# Patient Record
Sex: MALE
[De-identification: names, ages, dates, MRNs, and addresses within clinical notes are randomized per-mention and may not be internally consistent; named-entity substitution may affect disease eponyms.]

---

## 2023-01-24 PROBLEM — Z00.00 ENCOUNTER FOR PREVENTIVE HEALTH EXAMINATION: Status: ACTIVE | Noted: 2023-01-24

## 2023-01-31 ENCOUNTER — APPOINTMENT (OUTPATIENT)
Dept: SURGERY | Facility: CLINIC | Age: 73
End: 2023-01-31
Payer: MEDICARE

## 2023-01-31 VITALS
HEIGHT: 64 IN | HEART RATE: 112 BPM | SYSTOLIC BLOOD PRESSURE: 145 MMHG | WEIGHT: 140 LBS | DIASTOLIC BLOOD PRESSURE: 95 MMHG | BODY MASS INDEX: 23.9 KG/M2

## 2023-01-31 DIAGNOSIS — Z80.1 FAMILY HISTORY OF MALIGNANT NEOPLASM OF TRACHEA, BRONCHUS AND LUNG: ICD-10-CM

## 2023-01-31 DIAGNOSIS — Z86.718 PERSONAL HISTORY OF OTHER VENOUS THROMBOSIS AND EMBOLISM: ICD-10-CM

## 2023-01-31 DIAGNOSIS — Z87.898 PERSONAL HISTORY OF OTHER SPECIFIED CONDITIONS: ICD-10-CM

## 2023-01-31 DIAGNOSIS — Z86.79 PERSONAL HISTORY OF OTHER DISEASES OF THE CIRCULATORY SYSTEM: ICD-10-CM

## 2023-01-31 DIAGNOSIS — Z78.9 OTHER SPECIFIED HEALTH STATUS: ICD-10-CM

## 2023-01-31 DIAGNOSIS — F17.210 NICOTINE DEPENDENCE, CIGARETTES, UNCOMPLICATED: ICD-10-CM

## 2023-01-31 PROCEDURE — 99204 OFFICE O/P NEW MOD 45 MIN: CPT

## 2023-01-31 RX ORDER — CETIRIZINE HCL 10 MG
10 TABLET ORAL
Refills: 0 | Status: ACTIVE | COMMUNITY

## 2023-01-31 NOTE — CONSULT LETTER
[Dear  ___] : Dear  [unfilled], [Consult Letter:] : I had the pleasure of evaluating your patient, [unfilled]. [Please see my note below.] : Please see my note below. [Consult Closing:] : Thank you very much for allowing me to participate in the care of this patient.  If you have any questions, please do not hesitate to contact me. [Sincerely,] : Sincerely, [FreeTextEntry2] : Dr. Endy Cleaning, Dr. Jp Dao [FreeTextEntry3] : Candido Culp MD, FACS\par System Director, Endocrine Surgery\par [facility]\par Associate  Professor of Surgery\par [employer] [facility]  [DrRiley  ___] : Dr. PITTMAN

## 2023-01-31 NOTE — REASON FOR VISIT
[Initial Consultation] : an initial consultation for [Family Member] : family member [FreeTextEntry2] : Neck mass

## 2023-01-31 NOTE — PHYSICAL EXAM
[de-identified] : no palpable masses [de-identified] : no palpable thyroid nodules - thyroid below sternal notch [Laryngoscopy Performed] : laryngoscopy was performed, see procedure section for findings [Midline] : located in midline position [Normal] : orientation to person, place, and time: normal [de-identified] : indirect  laryngoscopy shows normal vocal cord mobility bilaterally with no lesions noted

## 2023-01-31 NOTE — ASSESSMENT
[FreeTextEntry1] : requested sonogram guided fine needle aspiration cytology of supraclavicular mass and thyroid nodule with cyto tech present to confirm adequacy of specimens. to call next week for results patient has been given the opportunity to ask questions, and all of the patient's questions have been answered to their satisfaction

## 2023-01-31 NOTE — HISTORY OF PRESENT ILLNESS
[de-identified] : Pt c/o neck mass and thyroid nodule found on CT scan after fall.  denies pain, infection or growth, fever, night sweats, skin cancer excision, dysphagia, hoarseness or skin cancer excision. \par CT scan:  3 cm Right supraclavicular mass, 2.1 cm  cystic nodule isthmus\par I have reviewed all old and new data and available images. Additional information was obtained from others present at the time of visit to ensure the completeness of the history\par

## 2023-02-15 ENCOUNTER — APPOINTMENT (OUTPATIENT)
Dept: ULTRASOUND IMAGING | Facility: IMAGING CENTER | Age: 73
End: 2023-02-15
Payer: MEDICARE

## 2023-02-15 ENCOUNTER — OUTPATIENT (OUTPATIENT)
Dept: OUTPATIENT SERVICES | Facility: HOSPITAL | Age: 73
LOS: 1 days | End: 2023-02-15
Payer: MEDICARE

## 2023-02-15 ENCOUNTER — RESULT REVIEW (OUTPATIENT)
Age: 73
End: 2023-02-15

## 2023-02-15 DIAGNOSIS — E04.1 NONTOXIC SINGLE THYROID NODULE: ICD-10-CM

## 2023-02-15 DIAGNOSIS — R22.1 LOCALIZED SWELLING, MASS AND LUMP, NECK: ICD-10-CM

## 2023-02-15 PROCEDURE — 20206 BIOPSY MUSCLE PERQ NEEDLE: CPT

## 2023-02-15 PROCEDURE — 76942 ECHO GUIDE FOR BIOPSY: CPT

## 2023-02-15 PROCEDURE — 88341 IMHCHEM/IMCYTCHM EA ADD ANTB: CPT

## 2023-02-15 PROCEDURE — 88305 TISSUE EXAM BY PATHOLOGIST: CPT

## 2023-02-15 PROCEDURE — 88172 CYTP DX EVAL FNA 1ST EA SITE: CPT

## 2023-02-15 PROCEDURE — 88173 CYTOPATH EVAL FNA REPORT: CPT | Mod: 26

## 2023-02-15 PROCEDURE — 88342 IMHCHEM/IMCYTCHM 1ST ANTB: CPT | Mod: 26

## 2023-02-15 PROCEDURE — 88342 IMHCHEM/IMCYTCHM 1ST ANTB: CPT

## 2023-02-15 PROCEDURE — 88305 TISSUE EXAM BY PATHOLOGIST: CPT | Mod: 26

## 2023-02-15 PROCEDURE — 88173 CYTOPATH EVAL FNA REPORT: CPT

## 2023-02-15 PROCEDURE — 76942 ECHO GUIDE FOR BIOPSY: CPT | Mod: 26

## 2023-02-15 PROCEDURE — 88341 IMHCHEM/IMCYTCHM EA ADD ANTB: CPT | Mod: 26

## 2023-02-17 LAB — NON-GYNECOLOGICAL CYTOLOGY STUDY: SIGNIFICANT CHANGE UP

## 2023-02-20 ENCOUNTER — TRANSCRIPTION ENCOUNTER (OUTPATIENT)
Age: 73
End: 2023-02-20

## 2023-02-27 ENCOUNTER — NON-APPOINTMENT (OUTPATIENT)
Age: 73
End: 2023-02-27

## 2023-03-21 ENCOUNTER — APPOINTMENT (OUTPATIENT)
Dept: OTOLARYNGOLOGY | Facility: CLINIC | Age: 73
End: 2023-03-21
Payer: MEDICARE

## 2023-03-21 VITALS — SYSTOLIC BLOOD PRESSURE: 138 MMHG | DIASTOLIC BLOOD PRESSURE: 89 MMHG | HEART RATE: 90 BPM

## 2023-03-21 PROCEDURE — 31575 DIAGNOSTIC LARYNGOSCOPY: CPT

## 2023-03-21 PROCEDURE — 99204 OFFICE O/P NEW MOD 45 MIN: CPT | Mod: 25

## 2023-03-21 NOTE — HISTORY OF PRESENT ILLNESS
[de-identified] : pt is referred by Dr. Culp for clavicle mass and FNA on  2/15/2023 Myxoid lesion with chronic inflammation, hemosiderin deposition and scattered spindle cells. Ct of neck 1/2023 3 cm right supraclavicular mass.  \par Fell in Jan 2023 and went to ER - they did a CT scan and found mass\par Denies any pain to area, not bothersome at all\par No recent fevers or infections \par Denies dysphagia, odynophagia, aspirations, dyspnea, dysphonia

## 2023-03-21 NOTE — PROCEDURE
[Gag Reflex] : gag reflex preventing mirror examination [None] : none [Flexible Endoscope] : examined with the flexible endoscope [Serial Number: ___] : Serial Number: [unfilled] [de-identified] : No lesions in the NPx, OPx, HPx or larynx.  VC are mobile, airway patent.\par

## 2023-03-25 ENCOUNTER — OUTPATIENT (OUTPATIENT)
Dept: OUTPATIENT SERVICES | Facility: HOSPITAL | Age: 73
LOS: 1 days | End: 2023-03-25
Payer: MEDICARE

## 2023-03-25 ENCOUNTER — APPOINTMENT (OUTPATIENT)
Dept: CT IMAGING | Facility: CLINIC | Age: 73
End: 2023-03-25
Payer: MEDICARE

## 2023-03-25 DIAGNOSIS — R22.1 LOCALIZED SWELLING, MASS AND LUMP, NECK: ICD-10-CM

## 2023-03-25 PROCEDURE — 70491 CT SOFT TISSUE NECK W/DYE: CPT | Mod: 26,MH

## 2023-03-25 PROCEDURE — 70491 CT SOFT TISSUE NECK W/DYE: CPT

## 2023-04-10 ENCOUNTER — APPOINTMENT (OUTPATIENT)
Dept: CARDIOLOGY | Facility: CLINIC | Age: 73
End: 2023-04-10
Payer: MEDICARE

## 2023-04-10 ENCOUNTER — NON-APPOINTMENT (OUTPATIENT)
Age: 73
End: 2023-04-10

## 2023-04-10 VITALS
BODY MASS INDEX: 24.24 KG/M2 | DIASTOLIC BLOOD PRESSURE: 108 MMHG | HEIGHT: 64 IN | WEIGHT: 142 LBS | SYSTOLIC BLOOD PRESSURE: 164 MMHG | OXYGEN SATURATION: 97 % | HEART RATE: 113 BPM

## 2023-04-10 DIAGNOSIS — M79.89 OTHER SPECIFIED SOFT TISSUE DISORDERS: ICD-10-CM

## 2023-04-10 DIAGNOSIS — E78.5 HYPERLIPIDEMIA, UNSPECIFIED: ICD-10-CM

## 2023-04-10 DIAGNOSIS — I72.4 ANEURYSM OF ARTERY OF LOWER EXTREMITY: ICD-10-CM

## 2023-04-10 DIAGNOSIS — I10 ESSENTIAL (PRIMARY) HYPERTENSION: ICD-10-CM

## 2023-04-10 PROCEDURE — 99203 OFFICE O/P NEW LOW 30 MIN: CPT

## 2023-04-10 PROCEDURE — 93000 ELECTROCARDIOGRAM COMPLETE: CPT | Mod: NC

## 2023-04-10 RX ORDER — LISINOPRIL 40 MG/1
40 TABLET ORAL DAILY
Qty: 30 | Refills: 5 | Status: ACTIVE | COMMUNITY

## 2023-04-10 RX ORDER — MULTIVIT-MIN/IRON/FOLIC ACID/K 18-600-40
500 CAPSULE ORAL
Refills: 0 | Status: ACTIVE | COMMUNITY

## 2023-04-10 RX ORDER — METOPROLOL SUCCINATE 100 MG/1
100 TABLET, EXTENDED RELEASE ORAL DAILY
Qty: 30 | Refills: 3 | Status: ACTIVE | COMMUNITY

## 2023-04-10 RX ORDER — AMLODIPINE BESYLATE 5 MG/1
5 TABLET ORAL DAILY
Qty: 10 | Refills: 0 | Status: ACTIVE | COMMUNITY

## 2023-04-10 RX ORDER — FUROSEMIDE 40 MG/1
40 TABLET ORAL DAILY
Qty: 14 | Refills: 0 | Status: ACTIVE | COMMUNITY

## 2023-04-13 DIAGNOSIS — E04.1 NONTOXIC SINGLE THYROID NODULE: ICD-10-CM

## 2023-05-15 ENCOUNTER — APPOINTMENT (OUTPATIENT)
Dept: CARDIOLOGY | Facility: CLINIC | Age: 73
End: 2023-05-15
Payer: MEDICARE

## 2023-05-15 PROCEDURE — 93306 TTE W/DOPPLER COMPLETE: CPT

## 2023-06-09 NOTE — HISTORY OF PRESENT ILLNESS
[FreeTextEntry1] : 74 yo with a thyroid nodule, history of DVT, hypertension, and smoker who is here for preoperative evaluation for a supraclavicular mass. He is accompanied by his nursing help: Macey who has been taking care of him for 4 years. She mostly stays for about 8 hours/day. She will take walks with him but she herself is limited because of knee arthritis. \par \par He was able to walk from the parking garage to the hospital without stopping. He doesn't complain of shortness of breath and does not have chest pain.  Later during the clinic visit, his sister was on the phone and did notice that he gets short of breath walking up stairs. [place] Per Macey, he walks briskly enough. He has some lower leg swelling mostly around his ankles. He doesn't have any PND or orthopnea. Sleeps on 1 pillow. Doesn't complain about palpitations. \par \par Macey usually checks his blood pressure at home. He usually will have a blood pressure in the 120/80s. When he arrived his blood pressure was very high. I retook his blood pressure and it was: /80 mmHg\par \par \par SH: \par smokes 1 cigarette after dinner\par Stopped drinking wine \par Lives alone but has Macey during the day\par 2 sisters who call him everyday\par \par FH:\par Mother lived till she was 98 yo, and only had knee problems\par Father:  young, was a smoker, age 59 and had lung cancer\par Sisters: don't have any heart disease\par \par \par

## 2023-06-09 NOTE — ASSESSMENT
[FreeTextEntry1] : 72 yo with a thyroid nodule, history of DVT, hypertension, and smoker who is here for preoperative evaluation for a supraclavicular mass. \par \par 1. Preoperative evaluation\par This is a 72 yo male with cardiac risk factors including hypertension, smoking, age and gender. The surgery that he is undergoing seems to be a low risk surgery. ECG shows  sinus tachycardia at a rate of 118 bpm. There are no STTW changes, IVCD non-specific. \par I've scheduled him for an echocardiogram. I don't think he needs a stress test but I've emailed Dr. Salazar to get a better understanding of what the surgical risk of this resection.\par \par 2. Lower extremity edema\par This is probably related to hypertension and maybe diastolic heart failure. Hence, the echocardiogram will provide this insight. \par \par Addendum:\par Preoperative Note:\par Mr. Elfego Shelton is a 72 yo male with cardiac risk factors including hypertension, smoking, age and gender. The surgery that he is undergoing seems to be a low risk surgery. ECG shows  sinus tachycardia at a rate of 118 bpm. There are no STTW changes, IVCD non-specific. \par \par I spoke to Meg, patient's sister and communicated his echocardiogram results. Since our visit, he has not had any interval change in his symptons.\par \par He does not have any complaints of chest pain or shortness of breath and able to reach 4 METS. He had an echocardiogram showing normal left ventricular function and no significant valvular disease. He does not need further cardiac testing. Hence, he is at low risk for a cardiac event for this upcoming surgical removal of a supracalvicular mass. which is low risk.\par \par

## 2023-06-09 NOTE — PHYSICAL EXAM
[Well Nourished] : well nourished [No Acute Distress] : no acute distress [Normal Conjunctiva] : normal conjunctiva [Normal Venous Pressure] : normal venous pressure [No Carotid Bruit] : no carotid bruit [Normal S1, S2] : normal S1, S2 [No Murmur] : no murmur [No Rub] : no rub [No Gallop] : no gallop [Clear Lung Fields] : clear lung fields [Good Air Entry] : good air entry [No Respiratory Distress] : no respiratory distress  [Soft] : abdomen soft [Non Tender] : non-tender [No Masses/organomegaly] : no masses/organomegaly [Normal Bowel Sounds] : normal bowel sounds [Normal Gait] : normal gait [No Edema] : no edema [No Cyanosis] : no cyanosis [No Clubbing] : no clubbing [No Varicosities] : no varicosities [No Rash] : no rash [No Skin Lesions] : no skin lesions [Moves all extremities] : moves all extremities [No Focal Deficits] : no focal deficits [Normal Speech] : normal speech [Alert and Oriented] : alert and oriented [Normal memory] : normal memory [de-identified] : developmentally challenged [de-identified] : ptosis

## 2023-07-18 ENCOUNTER — APPOINTMENT (OUTPATIENT)
Dept: OTOLARYNGOLOGY | Facility: CLINIC | Age: 73
End: 2023-07-18
Payer: MEDICARE

## 2023-07-18 VITALS
DIASTOLIC BLOOD PRESSURE: 87 MMHG | SYSTOLIC BLOOD PRESSURE: 129 MMHG | HEART RATE: 69 BPM | BODY MASS INDEX: 24.24 KG/M2 | WEIGHT: 142 LBS | HEIGHT: 64 IN

## 2023-07-18 DIAGNOSIS — R22.1 LOCALIZED SWELLING, MASS AND LUMP, NECK: ICD-10-CM

## 2023-07-18 PROCEDURE — 99214 OFFICE O/P EST MOD 30 MIN: CPT | Mod: 25

## 2023-07-18 PROCEDURE — 31575 DIAGNOSTIC LARYNGOSCOPY: CPT

## 2023-07-18 NOTE — DATA REVIEWED
[de-identified] : CT Chest from [facility] from July 2023 reviewed - essentially stable R. level IV/VI mass.

## 2023-07-18 NOTE — HISTORY OF PRESENT ILLNESS
[de-identified] : 73 year old male following up for clavicle mass\par s/p FNA on 2/15/2023 Myxoid lesion with chronic inflammation, hemosiderin deposition and scattered spindle cells. Ct of neck 1/2023 3 cm right supraclavicular mass. States patient seems a little more fatigued since visit. Reports intermittent reflux-states patient coughs occasionally after eating. Continues to tolerate regular diet. Weight is stable. \par \par \par CT scan of neck completed 3/25/23. Impression: Right-sided heterogenous supraclavicular mass with internal hypodensity, just inferior and lateral to the right thyroid lobe, measuring 4.1 x 2.7 cm, relatively unchanged in size.\par

## 2023-07-18 NOTE — PHYSICAL EXAM
[Midline] : trachea located in midline position [Laryngoscopy Performed] : laryngoscopy was performed, see procedure section for findings [Normal] : no rashes [de-identified] : Firm mass in right level IV palpated, approx. 3 cm, somewhat mobile, no TTP.

## 2023-07-18 NOTE — REASON FOR VISIT
[Subsequent Evaluation] : a subsequent evaluation for [Family Member] : family member [Source: ______] : History obtained from [unfilled] [FreeTextEntry2] : clavicle mass

## 2023-07-18 NOTE — PROCEDURE
[Gag Reflex] : gag reflex preventing mirror examination [None] : none [Flexible Endoscope] : examined with the flexible endoscope [Serial Number: ___] : Serial Number: [unfilled] [de-identified] : No lesions in the NPx, OPx, HPx or larynx.  VC are mobile, airway patent.\par

## 2023-08-09 ENCOUNTER — APPOINTMENT (OUTPATIENT)
Dept: OTOLARYNGOLOGY | Facility: HOSPITAL | Age: 73
End: 2023-08-09